# Patient Record
Sex: FEMALE
[De-identification: names, ages, dates, MRNs, and addresses within clinical notes are randomized per-mention and may not be internally consistent; named-entity substitution may affect disease eponyms.]

---
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
## 2021-10-29 ENCOUNTER — OFFICE VISIT (OUTPATIENT)
Dept: URBAN - METROPOLITAN AREA CLINIC 23 | Facility: CLINIC | Age: 21
End: 2021-10-29
Payer: COMMERCIAL

## 2021-10-29 DIAGNOSIS — H52.223 REGULAR ASTIGMATISM, BILATERAL: Primary | ICD-10-CM

## 2021-10-29 PROCEDURE — 92004 COMPRE OPH EXAM NEW PT 1/>: CPT | Performed by: OPTOMETRIST

## 2021-10-29 ASSESSMENT — VISUAL ACUITY
OD: 20/20
OS: 20/25

## 2021-10-29 ASSESSMENT — KERATOMETRY
OD: 41.38
OS: 41.38

## 2021-10-29 ASSESSMENT — INTRAOCULAR PRESSURE
OD: 12
OS: 12

## 2022-04-18 NOTE — HPI: PIMPLES (ACNE)
How Severe Is Your Acne?: moderate
Is This A New Presentation, Or A Follow-Up?: Follow Up Acne
Additional Comments (Use Complete Sentences): Pt was given Avar, Clindamycin and Fabior foam, however they never picked them up from pharmacy.
Females Only: When Was Your Last Menstrual Period?: 02/27/2018
100